# Patient Record
Sex: FEMALE | Race: BLACK OR AFRICAN AMERICAN | URBAN - METROPOLITAN AREA
[De-identification: names, ages, dates, MRNs, and addresses within clinical notes are randomized per-mention and may not be internally consistent; named-entity substitution may affect disease eponyms.]

---

## 2024-01-08 ENCOUNTER — HOSPITAL ENCOUNTER (EMERGENCY)
Facility: HOSPITAL | Age: 24
Discharge: HOME | End: 2024-01-08
Attending: STUDENT IN AN ORGANIZED HEALTH CARE EDUCATION/TRAINING PROGRAM
Payer: MEDICAID

## 2024-01-08 VITALS
DIASTOLIC BLOOD PRESSURE: 82 MMHG | TEMPERATURE: 98.3 F | OXYGEN SATURATION: 99 % | RESPIRATION RATE: 16 BRPM | HEART RATE: 87 BPM | WEIGHT: 118 LBS | HEIGHT: 59 IN | SYSTOLIC BLOOD PRESSURE: 117 MMHG | BODY MASS INDEX: 23.79 KG/M2

## 2024-01-08 DIAGNOSIS — Z20.2 EXPOSURE TO STD: Primary | ICD-10-CM

## 2024-01-08 LAB
APPEARANCE UR: ABNORMAL
BACTERIA URNS QL MICRO: ABNORMAL /HPF
BILIRUB UR QL: NEGATIVE
C TRACH DNA SPEC QL NAA+PROBE: POSITIVE
CLUE CELLS VAG QL WET PREP: NORMAL
COLOR UR: ABNORMAL
EPITH CASTS URNS QL MICRO: ABNORMAL /LPF
GLUCOSE UR STRIP.AUTO-MCNC: NEGATIVE MG/DL
HCG UR QL: NEGATIVE
HGB UR QL STRIP: NEGATIVE
KETONES UR QL STRIP.AUTO: NEGATIVE MG/DL
KOH PREP SPEC: NORMAL
LEUKOCYTE ESTERASE UR QL STRIP.AUTO: ABNORMAL
N GONORRHOEA DNA SPEC QL NAA+PROBE: POSITIVE
NITRITE UR QL STRIP.AUTO: NEGATIVE
PH UR STRIP: 7 (ref 5–8)
PROT UR STRIP-MCNC: NEGATIVE MG/DL
RBC #/AREA URNS HPF: ABNORMAL /HPF (ref 0–5)
SAMPLE TYPE: ABNORMAL
SERVICE CMNT-IMP: ABNORMAL
SERVICE CMNT-IMP: NORMAL
SP GR UR REFRACTOMETRY: 1.02
SPECIMEN SOURCE: ABNORMAL
T VAGINALIS VAG QL WET PREP: NORMAL
URINE CULTURE IF INDICATED: ABNORMAL
UROBILINOGEN UR QL STRIP.AUTO: 0.2 EU/DL (ref 0.2–1)
WBC URNS QL MICRO: ABNORMAL /HPF (ref 0–4)

## 2024-01-08 PROCEDURE — 2500000003 HC RX 250 WO HCPCS

## 2024-01-08 PROCEDURE — 87591 N.GONORRHOEAE DNA AMP PROB: CPT

## 2024-01-08 PROCEDURE — 87210 SMEAR WET MOUNT SALINE/INK: CPT

## 2024-01-08 PROCEDURE — 96372 THER/PROPH/DIAG INJ SC/IM: CPT

## 2024-01-08 PROCEDURE — 6360000002 HC RX W HCPCS

## 2024-01-08 PROCEDURE — 81001 URINALYSIS AUTO W/SCOPE: CPT

## 2024-01-08 PROCEDURE — 87077 CULTURE AEROBIC IDENTIFY: CPT

## 2024-01-08 PROCEDURE — 81025 URINE PREGNANCY TEST: CPT

## 2024-01-08 PROCEDURE — 99284 EMERGENCY DEPT VISIT MOD MDM: CPT

## 2024-01-08 PROCEDURE — 87491 CHLMYD TRACH DNA AMP PROBE: CPT

## 2024-01-08 PROCEDURE — 87086 URINE CULTURE/COLONY COUNT: CPT

## 2024-01-08 PROCEDURE — 87186 SC STD MICRODIL/AGAR DIL: CPT

## 2024-01-08 RX ORDER — DOXYCYCLINE HYCLATE 100 MG
100 TABLET ORAL 2 TIMES DAILY
Qty: 14 TABLET | Refills: 0 | Status: SHIPPED | OUTPATIENT
Start: 2024-01-08 | End: 2024-01-15

## 2024-01-08 RX ORDER — AZITHROMYCIN 500 MG/1
1000 TABLET, FILM COATED ORAL
Status: DISCONTINUED | OUTPATIENT
Start: 2024-01-08 | End: 2024-01-08

## 2024-01-08 RX ADMIN — LIDOCAINE HYDROCHLORIDE 500 MG: 10 INJECTION, SOLUTION EPIDURAL; INFILTRATION; INTRACAUDAL; PERINEURAL at 14:36

## 2024-01-08 ASSESSMENT — PAIN - FUNCTIONAL ASSESSMENT: PAIN_FUNCTIONAL_ASSESSMENT: 0-10

## 2024-01-08 ASSESSMENT — PAIN SCALES - GENERAL: PAINLEVEL_OUTOF10: 0

## 2024-01-08 NOTE — ED TRIAGE NOTES
Pt reports she was exposed to gonorrhea, is having vaginal discharge and is concerned for pregnancy

## 2024-01-08 NOTE — ED PROVIDER NOTES
CLOUDY(!)   Specific Gravity, UA 1.020   pH, Urine 7.0   Protein, UA Negative   Glucose, UA Negative   Ketones, Urine Negative   Bilirubin, Urine Negative   Blood, Urine Negative   Urobilinogen, Urine 0.2   Nitrite, Urine Negative   Leukocyte Esterase, Urine MODERATE(!)   WBC, UA 10-20   RBC, UA 0-5   Epithelial Cells, UA MODERATE(!)   Bacteria, UA 1+(!)   Urine Culture if Indicated URINE CULTURE ORDERED(!)  UA with moderate leukocytes, 10-20 white blood cells, 1+ bacteria and moderate epithelial cells.  She is not having any urinary symptoms.  She does have vaginal discharge as well as exposure to gonorrhea.  Suspect this is likely contaminant.  Urine culture has been ordered.  If culture is positive, will call patient and treat as needed. [LK]      ED Course User Index  [LK] Holley Flower PA-C           FINAL IMPRESSION     1. Exposure to STD          DISPOSITION/PLAN   DISPOSITION Decision To Discharge 01/08/2024 02:37:09 PM           PATIENT REFERRED TO:  [facility]  [address] [place] [address]  [phone]  Schedule an appointment as soon as possible for a visit       [facility] EMERGENCY DEPT  [address] [place] [address]  [phone]    As needed, If symptoms worsen        DISCHARGE MEDICATIONS:     Medication List        START taking these medications      doxycycline hyclate 100 MG tablet  Commonly known as: VIBRA-TABS  Take 1 tablet by mouth 2 times daily for 7 days               Where to Get Your Medications        Information about where to get these medications is not yet available    Ask your nurse or doctor about these medications  doxycycline hyclate 100 MG tablet           DISCONTINUED MEDICATIONS:  Discharge Medication List as of 1/8/2024  2:26 PM          I am the Primary Clinician of Record.     Holley Flower PA-C (electronically signed)    (Please note that parts of this dictation were completed with voice recognition software. Quite often

## 2024-01-10 LAB
BACTERIA SPEC CULT: ABNORMAL
CC UR VC: ABNORMAL
SERVICE CMNT-IMP: ABNORMAL

## 2024-01-10 NOTE — ED NOTES
Patient (s)  given copy of dc instructions and 1 script(s). Patient (s)  verbalized understanding of instructions and script (s). Patient given a current medication reconciliation form and verbalized understanding of their medications. Patient (s) verbalized understanding of the importance of discussing medications with his or her physician or clinic they will be following up with. Patient alert and oriented and in no acute distress. Patient discharged home ambulatory with a steady gait.   
Pt presents ambulatory to ED complaining of vaginal discharge, concern for pregnancy and reports her partner tested positive for Gonorrhea and is requesting treatment. Pt has her baby with her at bedside.    Patient is resting on stretcher. Call bell at bedside. Patient is A&Ox4, follows commands. Patient RR is regular rate and depth and vitals remain WNL. Patient is well appearing and does not appear to be in acute distress at this time. Patient and family updated on care plan. This RN will continue to monitor.     Emergency Department Nursing Plan of Care The Nursing Plan of Care is developed from the Nursing assessment and Emergency Department Attending provider initial evaluation. The plan of care may be reviewed in the “ED Provider note”.     The Plan of Care was developed with the following considerations:  Patient / Family readiness to learn indicated by:verbalized understanding  Persons(s) to be included in education: patient  Barriers to Learning/Limitations:None    Signed    Don Ram RN   1/8/2024   1:54 PM   
Speaking Coherently/Age appropriate

## 2024-08-19 ENCOUNTER — HOSPITAL ENCOUNTER (EMERGENCY)
Facility: HOSPITAL | Age: 24
Discharge: HOME | End: 2024-08-19
Attending: EMERGENCY MEDICINE
Payer: MEDICAID

## 2024-08-19 VITALS
HEART RATE: 69 BPM | RESPIRATION RATE: 19 BRPM | SYSTOLIC BLOOD PRESSURE: 117 MMHG | HEIGHT: 59 IN | BODY MASS INDEX: 22.4 KG/M2 | WEIGHT: 111.11 LBS | TEMPERATURE: 98.6 F | OXYGEN SATURATION: 99 % | DIASTOLIC BLOOD PRESSURE: 72 MMHG

## 2024-08-19 DIAGNOSIS — H10.9 CONJUNCTIVITIS OF BOTH EYES, UNSPECIFIED CONJUNCTIVITIS TYPE: ICD-10-CM

## 2024-08-19 DIAGNOSIS — L03.211 CELLULITIS OF FACE: Primary | ICD-10-CM

## 2024-08-19 DIAGNOSIS — L25.9 CONTACT DERMATITIS, UNSPECIFIED CONTACT DERMATITIS TYPE, UNSPECIFIED TRIGGER: ICD-10-CM

## 2024-08-19 PROCEDURE — 6370000000 HC RX 637 (ALT 250 FOR IP): Performed by: EMERGENCY MEDICINE

## 2024-08-19 PROCEDURE — 99283 EMERGENCY DEPT VISIT LOW MDM: CPT

## 2024-08-19 RX ORDER — POLYMYXIN B SULFATE AND TRIMETHOPRIM 1; 10000 MG/ML; [USP'U]/ML
1 SOLUTION OPHTHALMIC EVERY 4 HOURS
Qty: 3 ML | Refills: 0 | Status: SHIPPED | OUTPATIENT
Start: 2024-08-19 | End: 2024-08-29

## 2024-08-19 RX ORDER — CLINDAMYCIN HCL 300 MG
300 CAPSULE ORAL 3 TIMES DAILY
Qty: 21 CAPSULE | Refills: 0 | Status: SHIPPED | OUTPATIENT
Start: 2024-08-19 | End: 2024-08-19

## 2024-08-19 RX ORDER — PREDNISONE 10 MG/1
TABLET ORAL
Qty: 1 EACH | Refills: 0 | Status: SHIPPED | OUTPATIENT
Start: 2024-08-19 | End: 2024-08-19

## 2024-08-19 RX ORDER — CLINDAMYCIN HCL 150 MG
150 CAPSULE ORAL
Status: COMPLETED | OUTPATIENT
Start: 2024-08-19 | End: 2024-08-19

## 2024-08-19 RX ORDER — PREDNISONE 10 MG/1
TABLET ORAL
Qty: 1 EACH | Refills: 0 | Status: SHIPPED | OUTPATIENT
Start: 2024-08-19

## 2024-08-19 RX ORDER — CLINDAMYCIN HCL 300 MG
300 CAPSULE ORAL 3 TIMES DAILY
Qty: 21 CAPSULE | Refills: 0 | Status: SHIPPED | OUTPATIENT
Start: 2024-08-19 | End: 2024-08-26

## 2024-08-19 RX ORDER — PREDNISONE 20 MG/1
60 TABLET ORAL
Status: COMPLETED | OUTPATIENT
Start: 2024-08-19 | End: 2024-08-19

## 2024-08-19 RX ADMIN — PREDNISONE 60 MG: 20 TABLET ORAL at 11:32

## 2024-08-19 RX ADMIN — CLINDAMYCIN HYDROCHLORIDE 150 MG: 150 CAPSULE ORAL at 11:32

## 2024-08-19 ASSESSMENT — PAIN DESCRIPTION - LOCATION: LOCATION: EYE;FACE

## 2024-08-19 ASSESSMENT — LIFESTYLE VARIABLES
HOW MANY STANDARD DRINKS CONTAINING ALCOHOL DO YOU HAVE ON A TYPICAL DAY: PATIENT DOES NOT DRINK
HOW OFTEN DO YOU HAVE A DRINK CONTAINING ALCOHOL: NEVER

## 2024-08-19 ASSESSMENT — PAIN DESCRIPTION - ORIENTATION: ORIENTATION: RIGHT;LEFT

## 2024-08-19 ASSESSMENT — PAIN - FUNCTIONAL ASSESSMENT: PAIN_FUNCTIONAL_ASSESSMENT: 0-10

## 2024-08-19 ASSESSMENT — PAIN DESCRIPTION - DESCRIPTORS: DESCRIPTORS: ACHING;BURNING

## 2024-08-19 ASSESSMENT — PAIN DESCRIPTION - PAIN TYPE: TYPE: ACUTE PAIN

## 2024-08-19 ASSESSMENT — PAIN SCALES - GENERAL: PAINLEVEL_OUTOF10: 8

## 2024-08-19 NOTE — ED TRIAGE NOTES
Pt presents to the ED c/o facial rash, facial swelling and skin irritation x 3 days. Pt was seen 2 days ago and received Benadryl. Pt has been taking Benadryl with no relief. Last dose was yesterday at 1700.

## 2024-08-19 NOTE — ED NOTES
Pt presents ambulatory to ED complaining of facial rash and swelling x3 days, [facility]. She reports taking benadryl but has unable to  the prescription medication from the pharmacy. Pt last dose of benadryl was x1 day ago. Pt has bilateral eye swelling and been using a warm compress. Pt has drainage from her forehead and bright pink skin. Pt is alert and oriented x 4, RR even and unlabored, skin is warm and dry. Assessment completed and pt updated on plan of care.        Emergency Department Nursing Plan of Care        The Nursing Plan of Care is developed from the Nursing assessment and Emergency Department Attending provider initial evaluation.  The plan of care may be reviewed in the “ED Provider note”.

## 2024-08-19 NOTE — ED NOTES
Discharge instructions were given to the patient by Apryl Mishra RN  .     The patient left the Emergency Department ambulatory, alert and oriented and in no acute distress with 3 prescriptions. The patient was encouraged to call or return to the ED for worsening issues or problems and was encouraged to schedule a follow up appointment for continuing care. Patient discharged home ambulatory with a steady gait.    The patient verbalized understanding of discharge instructions and prescriptions, all questions were answered. The patient has no further concerns at this time.

## 2024-08-19 NOTE — ED PROVIDER NOTES
[facility] EMERGENCY DEPT  EMERGENCY DEPARTMENT ENCOUNTER       Pt Name: Hayder Manrique  MRN: [id number]  Birthdate 2000  Date of evaluation: 8/19/2024  Provider: Margaret Gonzalez MD   PCP: No primary care provider on file.  Note Started: 8:50 AM 8/19/24     (Please note that parts of this dictation were completed with voice recognition software. Quite often unanticipated grammatical, syntax, homophones, and other interpretive errors are inadvertently transcribed by the computer software. Please disregards these errors. Please excuse any errors that have escaped final proofreading.)    CHIEF COMPLAINT       Chief Complaint   Patient presents with    Allergic Reaction        HISTORY OF PRESENT ILLNESS: 1 or more elements      History From: patient, History limited by:  none     Hayder Manrique is a 23 y.o. female who presents ambulatory to the ED with worsening facial rash, eye swelling, itching.  Patient states she gets chronic forehead breakouts that she picks them.  That is how this started.  Has progressively worsened.  Now the forehead rash is worse and she has lesions extending down around her eyes and central face.  Her eyelids are puffy, and her eyes are red and itching.  She denies purulent drainage or purulent crusting around eyes on awakening in the morning.  Denies vision change she was [facility] and diagnosed with molluscum and possible conjunctivitis.  She was prescribed erythromycin ointment and advised take Benadryl.  She was unable to fill the erythromycin ointment prescription and so has just been taking Benadryl since then.  She woke up with worsening eye swelling and itching today.  I was so swollen this morning when she woke up that she could not open it.  She applied cold rags which helped.  Rash has become more red.     Nursing Notes were all reviewed and agreed with or any disagreements were addressed in the HPI.     REVIEW OF SYSTEMS        Positives and Pertinent negatives as per